# Patient Record
Sex: FEMALE | Race: BLACK OR AFRICAN AMERICAN | NOT HISPANIC OR LATINO | Employment: PART TIME | URBAN - METROPOLITAN AREA
[De-identification: names, ages, dates, MRNs, and addresses within clinical notes are randomized per-mention and may not be internally consistent; named-entity substitution may affect disease eponyms.]

---

## 2017-03-21 ENCOUNTER — HOSPITAL ENCOUNTER (OUTPATIENT)
Dept: BEHAVIORAL HEALTH | Facility: CLINIC | Age: 42
Discharge: HOME | End: 2017-03-21
Attending: SOCIAL WORKER | Admitting: SOCIAL WORKER
Payer: COMMERCIAL

## 2017-03-21 PROCEDURE — H0001 ALCOHOL AND/OR DRUG ASSESS: HCPCS

## 2017-03-21 ASSESSMENT — ANXIETY QUESTIONNAIRES
1. FEELING NERVOUS, ANXIOUS, OR ON EDGE: MORE THAN HALF THE DAYS
4. TROUBLE RELAXING: MORE THAN HALF THE DAYS
6. BECOMING EASILY ANNOYED OR IRRITABLE: SEVERAL DAYS
IF YOU CHECKED OFF ANY PROBLEMS ON THIS QUESTIONNAIRE, HOW DIFFICULT HAVE THESE PROBLEMS MADE IT FOR YOU TO DO YOUR WORK, TAKE CARE OF THINGS AT HOME, OR GET ALONG WITH OTHER PEOPLE: SOMEWHAT DIFFICULT
7. FEELING AFRAID AS IF SOMETHING AWFUL MIGHT HAPPEN: NEARLY EVERY DAY
3. WORRYING TOO MUCH ABOUT DIFFERENT THINGS: NEARLY EVERY DAY
5. BEING SO RESTLESS THAT IT IS HARD TO SIT STILL: NOT AT ALL
2. NOT BEING ABLE TO STOP OR CONTROL WORRYING: NEARLY EVERY DAY
GAD7 TOTAL SCORE: 14

## 2017-03-21 ASSESSMENT — PAIN SCALES - GENERAL: PAINLEVEL: MILD PAIN (2)

## 2017-03-21 NOTE — PROGRESS NOTES
[address] [address]               ADULT CD ASSESSMENT      Additional Clinical Questions - Outpatient    Patient Name: Acacia Poe  Cell Phone:   Home: [phone] (home) none (work)   Mobile:   Telephone Information:   Mobile [phone]       Email:  [email or web address]  Emergency Contact: Alfredo Jenkins   Tel: [phone]    ________________________________________________________________________      The patient is      With which race do you identify? / Black    Please list your family members and if they are living or , i.e. (grandparents, parents, step-parents, adoptive parents, number of siblings, half-siblings, etc.)     Mother   Living Father    No Step-mother   NA No Step-father NA   Maternal Grandmother   NA Fraternal Grandmother NA   Maternal Grandfather    NA Fraternal Grandfather NA   1 Sister(s)  No Brother(s)   NA   No Half-sister(s)   NA No Half-brother(s) NA             Who raised you? (parents, grandparents, adoptive parents, step-parents, etc.)    Adoptive parents    Have any of your family members or significant others had problems with mental illness or substance abuse?  Please explain.    Bio-sister depression, anxiety, sociopathic tendencies    Do you have any children or Stepchildren? Yes, please explain: Herberth (21), [place] (14), Karime (12)    Are you being investigated by Child Protection Services? No    Do you have a child protection worker, probation office or ? No    How would you describe your current finances?  Just making it    If you are having problems, (unpaid bills, bankruptcy, IRS problems) please explain:  Yes, please explain: unpaid bills    If working or a student are you able to function appropriately in that setting? No, please explain: not currently, hard to concentrate    Describe your preferred learning style:  by hands-on practice and by  watching someone else demonstrate    What personal strengths do you have that can help you get sober?  Drive, able to overcome adversity well.    Do you currently self-administer your medications?  Yes    Have you ever:    Had to lie to people important to you about how much you pulido?     No     Felt the need to bet more and more money?      Yes, If yes explain: no concerns.     Attempted treatment for a gambling problem?        No     Touched or fondled someone else inappropriately, or forced them to have sex with you against their will?       No     Are you or have you ever been a registered sex offender?        No     Is there any history of sexual abuse in your family?        No     [place] obsessed by your sexual behavior (having sex with many partners, masturbating often, using pornography often?        No     Received therapy or stayed in the hospital for mental health problems?        No     Hurt yourself (cutting, burning or hitting yourself)?        No     Purged, binged or restricted yourself as a way to control your weight?      No       Are you on a special diet?       No       Do you have any concerns regarding your nutritional status?        Yes, If yes explain: weight gain       Have you had any appetite changes in the last 3 months?        No       Have you had any weight loss or weight gain in the last 3 months?  If yes, how much gain or loss:     If weight patient gains more than 10 lbs or loses more than 10 lbs, refer to program RN /  Attending Physician for assessment.    Yes, If yes explain: gained a significant amount of weight over past year        Was the patient informed of BMI?         No     Do you have any dental problems?        Yes, If yes explain: need to complete a root canal     Lived through any trauma or stressful events?        Yes, If yes explain: adoption and everything that came with my adoption.     In the past month, have you had any of the following symptoms related to the  trauma listed above? (Dreams, intense memories, flashbacks, physical reactions, etc.)         No     Believed that people are spying on you, or that someone was plotting against you or trying to hurt you?       No     Believed that someone was reading your mind or could hear your thoughts or that you could actually read someone's mind, hear what another person was thinking?       No     Believed that someone or some force outside of yourself put thoughts in your mind that were not your own, or made you act in a way that was not your usual self?  Or have you ever thought you were possessed?         No     Believed that you were being sent special messages through the TV, radio or newspaper?         No     [place] things other people couldn't hear, such as voices?         No     Had visions when you were awake?  Or have you ever seen things other people couldn't see?       No         Suicide Screening Questions:    1. Are you feeling hopeless about the present/future?   No   2. Have you ever had thoughts about taking your life?   No   3. When did you have these thoughts? NA   4. Do you have any current intent or active desire to take your life?   No   5. Do you have a plan to take your life?    No   6. Have you ever made a suicide attempt?   No   7. Do you have access to pills, guns or other methods to kill yourself?   No       Risk Status - Use as Guide/Example    Ideation - Active  Thoughts of suicide Intent to follow  Through on suicide Plan for completing  suicide    Yes No Yes No Yes No   Emergent X  X  X    Urgent / Non-Emergent X  X   X   Non-Urgent X   X  X   No Current / Active Risk (Past 6 Months)  X  X  X   Acacia Poe No No No       Additional Risk Factors: Significant history of trauma and/or abuse issues  Alcohol abuse   Protective Factors:  Having people in his/her life that would prevent the patient from considering committing suicide (i.e. young children, spouse, parents, etc.)  Having easy  "access to supportive family members     Risk Status:    Emergent? No  Urgent / Non-Emergent?  No  Present / Non- Urgent? No      No Current Risk? Yes, Evaluation Counselors - Document in Epic / SBAR to counselor \"No identified risk\" and Treatment Counselors - Assess weekly in progress notes under [facility] and summarize in Discharge / Treatment summary under [facility].    Additional information to support suicide risk rating: See Above    Mental Status Assessment    Physical Appearance/Attire:  Appears stated age and Attire appropriate to age/situation  Hygiene:  well groomed  Eye Contact:  at examiner  Speech:  regular  Speech Volume:  regular  Speech Quality: fluid  Cognitive/Perceptual:  reality based  Cognition:  memory intact   Judgment:  intact  Insight:  intact  Orientation:  time, place, person and situation  Thought:  logical   Hallucinations:  none  General Behavioral Tone:  cooperative  Psychomotor Activity:  no problem noted  Gait:  no problem  Mood:  appropriate  Affect:  congruence/appropriate    Counselor Notes: NA    Criteria for Diagnosis  DSM-5 Criteria for Substance Abuse    303.90 (F10.20) Alcohol Use Disorder Severe  305.10 (Z72.0)  Tobacco Use Disorder Mild    LEVEL OF CARE    Intoxication and Withdrawal: 0  Biomedical:  0  Emotional and Behavioral:  2  Readiness to Change:  0  Relapse Potential: 3  Recovery Environmental:  2    Initial problem list:    The patient has poor coping skills  The patient lacks a sober peer support network    Patient/Client is willing to follow treatment recommendations.  Yes    Yary Michelle [facility]       Vulnerable Adult Checklist for OUTPATIENTS     1.  Do you have a physical, emotional or mental infirmity or dysfunction?       No    2.  Does this issue impair your ability to provide for your own care without help, including providing yourself with food, shelter, clothing, healthcare or supervision?       No    3.  Because of this issue, I need assistance " to protect myself from maltreatment by others.      No    Based on the above information:    This person is not a functional Vulnerable Adult according to [place] Statute 626.5572 subdivision 21.

## 2017-03-21 NOTE — PROGRESS NOTES
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 Day outpatient treatment    Do you have a preference for a particular treatment program? [facility]    Criteria for Diagnosis     Criteria for Diagnosis  DSM-5 Criteria for Substance Use Disorder  Instructions: Determine whether the client currently meets the criteria for Substance Use Disorder using the diagnostic criteria in the DSM-V pp.481-589. Current means during the most recent 12 months outside a facility that controls access to substances    Category of Substance Severity (ICD-10 Code / DSM 5 Code)     Alcohol Use Disorder Severe  (10.20) (303.90)   Cannabis Use Disorder NA   Hallucinogen Use Disorder NA   Inhalant Use Disorder NA   Opioid Use Disorder NA   Sedative, Hypnotic, or Anxiolytic Use Disorder NA   Stimulant Related Disorder NA   Tobacco Use Disorder Mild    (Z72.0) (305.1)   Other (or unknown) Substance Use Disorder NA       Collateral Contact Summary   Number of contacts made: 1    Contact with referring person:  Yes.    If court related records were reviewed, summarize here: NA    Information from collateral contacts supported/largely agreed with information from the client and associated risk ratings.      Rule 25 Assessment Summary and Plan   's  Recommendation    Client is recommended to attend the day co-occurring outpatient treatment program at [facility].      Collateral Contacts     Name:    Jose Colliernemikki   Relationship:    Therapist  [facility]   Phone Number:    [phone] Releases:    Yes     Faxed TAI 3/21/17.  She says she plans to go to recovery or AA meetings daily.  Weekly individual therapy with me or therapist there, will continue with me following her participation in that program.      Collateral Contacts     Name:    n/a   Relationship:       Phone Number:       Releases:             ollateral Contacts      A problematic pattern of alcohol/drug use leading to clinically significant impairment or distress, as manifested by at least two of the following, occurring within a 12-month period:    Alcohol/drug is often taken in larger amounts or over a longer period than was intended.  A great deal of time is spent in activities necessary to obtain alcohol, use alcohol, or recover from its effects.  Craving, or a strong desire or urge to use alcohol/drug  Continued alcohol use despite having persistent or recurrent social or interpersonal problems caused or exacerbated by the effects of alcohol/drug.  Alcohol/drug use is continued despite knowledge of having a persistent or recurrent physical or psychological problem that is likely to have been caused or exacerbated by alcohol.  Tolerance, as defined by either of the following: A need for markedly increased amounts of alcohol/drug to achieve intoxication or desired effect. and A markedly diminished effect with continued use of the same amount of alcohol/drug.  Withdrawal, as manifested by either of the following: The characteristic withdrawal syndrome for alcohol/drug (refer to Criteria A and B of the criteria set for alcohol/drug withdrawal). and Alcohol/drug (or a closely related substance, such as a benzodiazepine) is taken to relieve or avoid withdrawal symptoms.      Specify if:  In early remission:  After full criteria for alcohol/drug use disorder were previously met, none of the criteria for alcohol/drug use disorder have been met for at least 3 months but for less than 12 months (with the exception that Criterion A4,  Craving or a strong desire or urge to use alcohol/drug  may be met).     In sustained remission:   After full criteria for alcohol use disorder were previously met, non of the criteria for alcohol/drug use disorder have been met at any time during a period of 12 months or longer (with the exception that Criterion A4,  Craving or strong desire or urge to use alcohol/drug  may be met).   Specify if:   This additional specifier is used if the individual is in an environment where access to alcohol is restricted.    Mild: Presence of 2-3 symptoms    Moderate: Presence of 4-5 symptoms    Severe: Presence of 6 or more symptoms

## 2017-03-22 ENCOUNTER — BEH TREATMENT PLAN (OUTPATIENT)
Dept: BEHAVIORAL HEALTH | Facility: CLINIC | Age: 42
End: 2017-03-22
Attending: FAMILY MEDICINE

## 2017-03-22 ENCOUNTER — HOSPITAL ENCOUNTER (OUTPATIENT)
Dept: BEHAVIORAL HEALTH | Facility: CLINIC | Age: 42
End: 2017-03-22
Attending: SOCIAL WORKER
Payer: COMMERCIAL

## 2017-03-22 PROBLEM — F19.20 CHEMICAL DEPENDENCY (H): Status: ACTIVE | Noted: 2017-03-22

## 2017-03-22 PROCEDURE — H2035 A/D TX PROGRAM, PER HOUR: HCPCS | Mod: HQ

## 2017-03-22 ASSESSMENT — PATIENT HEALTH QUESTIONNAIRE - PHQ9: SUM OF ALL RESPONSES TO PHQ QUESTIONS 1-9: 12

## 2017-03-22 ASSESSMENT — ANXIETY QUESTIONNAIRES: GAD7 TOTAL SCORE: 14

## 2017-03-22 NOTE — PROGRESS NOTES
D- Client attended orientation this date. Client was given an orientation packet which was reviewed with them in its entirety. The following was specifically reviewed with the client: Program philosophy, treatment goals, program schedules, education components, the family program, using treatment materials, program rules, client rights/responsibilities, information on HIV, STDS, Hepatitis, TB, information on use while pregnant, opioid information and Narcan information, program abuse prevention plan/reporting protocol, client grievance procedure, risks of treatment, confidentiality, treatment agreement, facility tour/safety information and information on co-occurring disorders. Client was also given information on insurance and was given the business office contact information should they have any questions. Client participated in group process and sharing goals regarding treatment.    I- Signing of paperwork, tour of facility, provided client with counselor's name and phone number. Introduced to group processes by sharing and feedback, educated on stage of change.    A- Client appeared engaged and motivated.    P- Client will review orientation paperwork and materials.  Client will contact her primary counselor to schedule primary group date and discuss motivations and goals for treatment.
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
## 2017-03-22 NOTE — PROGRESS NOTES
Initial Services Plan        Before your first treatment group, please do the following    Immediate health & safety concerns: Go to the emergency room if you start to have withdrawal symptoms.    Suggestions for client during the time between intake & completion of treatment plan:  Review your patient or client handbook.    Client issues to be addressed in the first treatment sessions:  Identify motivations(s) for coming to treatment, i.e. legal, family, job, self      DANIELLE Mueller  3/22/2017  6:33 PM

## 2017-04-04 ENCOUNTER — HOSPITAL ENCOUNTER (OUTPATIENT)
Dept: BEHAVIORAL HEALTH | Facility: CLINIC | Age: 42
End: 2017-04-04
Attending: SOCIAL WORKER
Payer: COMMERCIAL

## 2017-04-04 PROCEDURE — H2035 A/D TX PROGRAM, PER HOUR: HCPCS | Mod: HQ

## 2017-04-04 ASSESSMENT — PATIENT HEALTH QUESTIONNAIRE - PHQ9: 5. POOR APPETITE OR OVEREATING: NEARLY EVERY DAY

## 2017-04-04 ASSESSMENT — ANXIETY QUESTIONNAIRES
7. FEELING AFRAID AS IF SOMETHING AWFUL MIGHT HAPPEN: NEARLY EVERY DAY
5. BEING SO RESTLESS THAT IT IS HARD TO SIT STILL: MORE THAN HALF THE DAYS
IF YOU CHECKED OFF ANY PROBLEMS ON THIS QUESTIONNAIRE, HOW DIFFICULT HAVE THESE PROBLEMS MADE IT FOR YOU TO DO YOUR WORK, TAKE CARE OF THINGS AT HOME, OR GET ALONG WITH OTHER PEOPLE: VERY DIFFICULT
2. NOT BEING ABLE TO STOP OR CONTROL WORRYING: NEARLY EVERY DAY
6. BECOMING EASILY ANNOYED OR IRRITABLE: SEVERAL DAYS
1. FEELING NERVOUS, ANXIOUS, OR ON EDGE: NEARLY EVERY DAY
3. WORRYING TOO MUCH ABOUT DIFFERENT THINGS: NEARLY EVERY DAY
GAD7 TOTAL SCORE: 18

## 2017-04-04 NOTE — PROGRESS NOTES
Patient Safety Plan Template    Name:   Acacia Poe YOB: 1975 Age:  41 year old MR Number:  [id number]   Step 1: Warning signs (Thoughts, images, mood, situation, behavior) that a crisis may be developin. Moods-sadness     2. Thoughts- failure     3. Behavior-antsy     Step 2: Internal coping strategies - Things I can do to take my mind off of my problems without contacting another person (relaxation technique, physical activity):     1. Meditation- VR     2. Go for a walk     3. Deep Breathing     Step 3: People and social settings that provide distraction:     1. Name: Naomi   Phone: [phone]   2. Name: Lyle   Phone: [phone]   3. Place: Library   4. Place: [facility]     The one thing that is most important to me and worth living for is: My children   Step 4: People whom I can ask for help:     1. Name: Daniele   Phone: [phone]     2. Name: Yessica   Phone: [phone]     3. Name: Eleni   Phone: [phone]     Step 5: Professionals or agencies I can contact during a crisis:     1. Clinician Name: Suman Espinoza   Phone: [phone]   Clinician Pager or Emergency Contact #: NA     2. Clinician Name: Jose Perez   Phone: [phone]     Clinician Pager or Emergency Contact #: NA     3. Local Urgent Care Services: [facility]    Urgent Care Services Address:     Urgent Care Services Phone: [phone]     4. Suicide Prevention Lifeline Phone: 3-862-020-RUUN (1960)     Step 6: Making the environment safe:     1. No alcohol     2. Do not allow use in the home.     Safety Plan Template 2008 Juani Rodriguez and Magdy Best is reprinted with the express permission of the authors.  No portion of the Safety Plan Template may be reproduced without the express, written permission.  You can contact the authors at [email or web address] or [email or web address].       Client was given a copy of this plan.  Silke Morelos, MSW, LADC, [facility]
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
## 2017-04-05 ASSESSMENT — ANXIETY QUESTIONNAIRES: GAD7 TOTAL SCORE: 18

## 2017-04-05 ASSESSMENT — PATIENT HEALTH QUESTIONNAIRE - PHQ9: SUM OF ALL RESPONSES TO PHQ QUESTIONS 1-9: 16

## 2017-04-10 ENCOUNTER — HOSPITAL ENCOUNTER (OUTPATIENT)
Dept: BEHAVIORAL HEALTH | Facility: CLINIC | Age: 42
End: 2017-04-10
Attending: SOCIAL WORKER
Payer: COMMERCIAL

## 2017-04-10 ENCOUNTER — BEH TREATMENT PLAN (OUTPATIENT)
Dept: BEHAVIORAL HEALTH | Facility: CLINIC | Age: 42
End: 2017-04-10

## 2017-04-10 PROCEDURE — H2035 A/D TX PROGRAM, PER HOUR: HCPCS | Mod: HQ

## 2017-04-10 PROCEDURE — H2035 A/D TX PROGRAM, PER HOUR: HCPCS

## 2017-04-10 ASSESSMENT — ANXIETY QUESTIONNAIRES
3. WORRYING TOO MUCH ABOUT DIFFERENT THINGS: NEARLY EVERY DAY
2. NOT BEING ABLE TO STOP OR CONTROL WORRYING: MORE THAN HALF THE DAYS
6. BECOMING EASILY ANNOYED OR IRRITABLE: MORE THAN HALF THE DAYS
GAD7 TOTAL SCORE: 16
IF YOU CHECKED OFF ANY PROBLEMS ON THIS QUESTIONNAIRE, HOW DIFFICULT HAVE THESE PROBLEMS MADE IT FOR YOU TO DO YOUR WORK, TAKE CARE OF THINGS AT HOME, OR GET ALONG WITH OTHER PEOPLE: SOMEWHAT DIFFICULT
5. BEING SO RESTLESS THAT IT IS HARD TO SIT STILL: MORE THAN HALF THE DAYS
1. FEELING NERVOUS, ANXIOUS, OR ON EDGE: MORE THAN HALF THE DAYS
7. FEELING AFRAID AS IF SOMETHING AWFUL MIGHT HAPPEN: NEARLY EVERY DAY

## 2017-04-10 ASSESSMENT — PATIENT HEALTH QUESTIONNAIRE - PHQ9: 5. POOR APPETITE OR OVEREATING: MORE THAN HALF THE DAYS

## 2017-04-10 NOTE — PROGRESS NOTES
Acknowledgement of Current Treatment Plan     [facility]:[id number]  I have reviewed my treatment plan with my therapist / counselor on 4/10/2017. I agree with the plan as it is written in the electronic health record. Last date of reported use of alcohol as: 04/02/2017    Name Signature   Acacia MARIANO Dari[place] Deepika    Name of Therapist / Counselor    Silke Morelos, MSW, LADC, [facility]       Current Outpatient Prescriptions   Medication     naltrexone (DEPADE;REVIA) 50 MG tablet     warfarin (COUMADIN) 5 MG tablet     warfarin (COUMADIN) 10 MG tablet     warfarin (COUMADIN) 2.5 MG tablet     ORDER FOR DME     Multiple Vitamins-Calcium (ONE-A-DAY WOMENS FORMULA) TABS     No current facility-administered medications for this encounter.

## 2017-04-10 NOTE — PROGRESS NOTES
[facility]  Adult Chemical Dependency Program  Treatment Plan Requirements    These services are provided by the facility for each patient/client according to the individual's treatment plan:    Individual and group counseling    Education    Transition services    Services to address any co-occurring mental illness    Service coordination    Initial Treatment Plan Goals:  1. Complete all the requirements of Program Orientation.  2. Maintain medication compliance throughout the program.  3. Complete requirements for workshop/skills groups based on identified issues on your problem list.  4. Complete the support group attendance feedback sheet weekly.  5. Gain family involvement in treatment process to address family issues from the problem list.  6. Attend and participate in all required groups per individual treatment plan.  7. Focus attention to individualized issues from the treatment plan.  8. Complete all requirements for UA's, alcohol screening tests and other testing.  9. Schedule a physical examination if recommended.    In addition to the above, complete all individual goals as specifically outlines on your treatment plan.    Criteria for discharge:  Patients/clients are discharged from the program following completion of the entire program including Phase I and II or acceptance of other post-treatment referrals such as senior care house, or aftercare at other facilities.  Patients/clients may also be discharged for inappropriate behavior or chemical use.      Favorable Discharge - Patients/clients have completed agreed upon treatment goals, understand their diagnosis and appear motivated about the follow-up care.    Guarded Discharge - Patients/clients have demonstrated some understanding of their diagnosis and recovery process, and have completed some of their treatment goals.  This prognosis also includes patients/clients who have completed some treatment goals but have not made  commitment to community support or follow through with referrals.    Unfavorable Discharge - Patients/clients have not completed agreed upon treatment goals due to their own choice, have limited understanding of their diagnosis, and have shown minimal or inconsistent behavior conducive to recovery.  Those patients/clients discharged due to behavioral problems will also be unfavorable discharges.                Adult CD Treatment Plan                                Acacia Poe   [id number]   1975 41 year old female      Acute Intoxication/Withdrawal Potential     DIMENSION 1  RISK FACTOR: 0    SUBSTANCE USE DISORDERS:    F10.20, alcohol use disorder, severe.   Z72.0, tobacco use disorder, mild.            Date Assigned Source Area of Treatment Focus / Goal / Treatment Strategies    Target  Date Initials Outcome Date Completed   4/10/2017  Self -  Current, History -  Current, Collateral -  Current and Assessment -  Current  Area of Treatment Focus:   Substance use, cravings and urges.  Last use date was reported as 04/02/2017.       Goal:   Develop effective strategies to maintain sobriety.      Treatment Strategies:   Report to counselor and group any alcohol or drug use. 08/29/17 MAS Changed, see below 5/9/2017    4/10/2017  Self -  Current, History -  Current, Collateral -  Current and Assessment -  Current  Area of Treatment Focus: Client stated she smokes 5 cigarettes per day.    Goal: To abstain from all nicotine products.    Treatment Strategies:  Client will read nicotine cessation pamphlet.   08/29/17 MAS Refused, clt does not want to quit at this time. 4/10/2017    5/9/2017  Self -  Current, History -  Current, Collateral -  Current and Assessment -  Current  Area of Treatment Focus:   Client stated she used over the weekend, one two separate occassions. Last use date was reported as 05/07/2017.       Goal:   Develop effective strategies to maintain sobriety.      Treatment Strategies:    Report to counselor and group any alcohol or drug use. 08/29/17 MAS Unknown due to absences 5/18/2017        Biomedical Conditions and Complaints     DIMENSION 2  RISK FACTOR: 1         Date Assigned Source Area of Treatment Focus / Goal / Treatment Strategies Target  Date Initials Outcome Date Completed   4/10/2017  Self -  Current, History -  Current, Collateral -  Current and Assessment -  Current  Area of Treatment Focus:  Client has a medical diagnosis of Blood clots.    Goal:   Follow recommendations of medical provider, Suman Damon.    Treatment Strategies:  1. Report change in severity of symptoms to staff.      2. Continue to take prescribed medications and follow-up with medical interventions while in program. 08/29/17 MAS [place] 5/18/2017        Emotional/Behavioral/Cognitive Conditions and Complications     DIMENSION 3  RISK FACTOR: 2          Date Assigned Source Area of Treatment Focus / Goal / Treatment Strategies Target  Date Initials Outcome Date Completed     4/10/2017  Self -  Current, History -  Current, Collateral -  Current and Assessment -  Current  Area of Treatment Focus:   History of symptoms of situational depression.       Goal:   Understand the relationship between addiction and mental health issues.    Treatment Strategies:  1. Client will attend a diagnostic assessment within the first month of treatment.    2. Identify 5 coping skills to reduce depression.  Practice techniques daily and when needed.    3. List 5 ways your addiction has impacted your mental health.      4. Come to group daily with a positive affirmation.    5. Client will attend DBT Psychoeducational lectures and practice skills taught during group outside group sessions as recommended.    6. Client will complete and present life and addiction history to the group one section at a time.     08/29/17              4/27/17      5/29/17      5/24/17    8/29/17    7/10/17      4/19/17 MAS Incomplete due to early  "discharge                                    Effective, childhood  Effective, Adolescent  Effective,  Adult 5/18/2017                                          4/2017    5/9/2017       5/15/2017    4/10/2017  Self -  Current, History -  Current, Collateral -  Current and Assessment -  Current  Area of Treatment Focus: Client stated she has low self esteem due to a sense of failure.    Goal: To increase self esteem and celebrate all accomplishments to overcome fear of failure.    Treatment Strategies:   1. Client will complete and then present assignment called, \"Coping with stress\".    2. Client will complete and then present assignment called, \"A different approach\".    3. Client will complete and then present assignment called, \"Correcting distorted thinking\".    4. Client will complete and then present assignment called, \"Getting out of myself\".   08/29/17 MAS Incomplete due to early discharge 5/18/2017         Readiness to Change     DIMENSION 4  RISK FACTOR: 0            Date Assigned Source Area of Treatment Focus / Goal / Treatment Strategies Target  Date Initials Outcome Date Completed   4/10/2017                                                Self -  Current, History -  Current, Collateral -  Current and Assessment -  Current  Area of Treatment Focus:  Client has consequences to self and others due to her alcohol use as evidenced by her sense of failure as a parent.      Goal:   Understand the impact your substance use has had on you, your family and significant relationships.    Treatment Strategies:  1.  Present using history, consequences of use and 5 values violated.    2. Invite family and concerned persons to family program.,         3. Participate in spiritual care groups.     4. Each week write down 3 reasons you want to remain sober.    5. Client will attend Phase 1 3 times per week for 2 hours each session for approximately 20-25 sessions.     6. Client will attend Phase 2 one time per week for 1 " "1/2 hours for approximately 12 weeks or until ready to transition from Phase 2.  08/29/17                  04/10/17    05/8 & 05/10/17      04/12/17    07/15/17    06/15/17      08/29/17 MAS Incomplete due to early discharge            Effective    Effective,  Just for 5/8/17    Did not attend               5/18/2017                   4/25/2017     05/10/2017        04/12/17                      Relapse/Continues Use/Continues Problem Potential     DIMENSION 5  RISK FACTOR: 3                Date Assigned Source Area of Treatment Focus / Goal / Treatment Strategies Target  Date Initials Outcome Date Completed   4/10/2017  Self -  Current, History -  Current, Collateral -  Current and Assessment -  Current  Area of Treatment Focus:   Client does not recognize relapse triggers and warning signs especially regarding sadness.       Goal:   Identify personal triggers and relapse warning signs, especially around sadness to decrease use episodes.    Treatment Strategies:  1.  Develop a relapse prevention plan including lifestyle changes, recovery activities, daily structure, self-care, support systems, spirituality, work, finances, recreation and crisis management.     2. Complete the triggers and cravings assignment and include alternative behaviors.     3. Identify and begin attending sober support groups.    4. Client will complete and then present assignment called, \"Communication Skills\". 08/29/17                  Given in  PH 2      04/17/17 04/27/17 MAS Incomplete due to early discharge                    Effective 5/18/2017 05/01/2017       Recovery Environment     DIMENSION 6  RISK FACTOR: 2     Date Assigned Source Area of Treatment Focus / Goal / Treatment Strategies Target  Date Initials Outcome Date Completed   4/10/2017  Self -  Current, History -  Current, Collateral -  Current and Assessment -  Current  Area of Treatment Focus:   Lacks a sober support network, where there is a " "lot of individuals in the client's life who create an increased amount of stress to the client.       Goal:   Develop a sober support network while setting boundaries to those who create stress for the client.    Treatment Strategies:  1. Complete the personal \"Recovery Care Assignments\"  A: Recovery Care Worksheet    B: Recovery Care Packet     2. List 10 situations, people, emotional responses that are unhealthy.  Develop a plan to avoid or manage them.      3. Client will secure a female sponsor whom has at least 2 years sobriety and have weekly contact with them.     4. Client will complete and then present assignment called, \"Forming stable relationships\".    5. Client will complete and then present assignment called, \"Creating a family ritual\". 08/29/17                  Given in Ph 2        6/10/17      7/15/17      4/26/17      05/9/17   MAS Incomplete due to early discharge 5/18/2017       All interventions that are designated as  current  will need to be completed in order to transition out of treatment with a favorable prognosis.   The treatment plan is a flexible document and a work in progress. Interventions and goals may be added at any time to customize plan to each individual s needs.   Client may work with therapist to change interventions as long as they pertain to the goals stipulated in the plan and/or are clinically driven.    Individual abuse prevention plan (required for lodging plus) : specific actions, referral:   No additional protection measures required other than the Program Abuse Prevention Plan - No   CRISTHIAN Lopez, LADC, LICSW                 "

## 2017-04-11 ASSESSMENT — PATIENT HEALTH QUESTIONNAIRE - PHQ9: SUM OF ALL RESPONSES TO PHQ QUESTIONS 1-9: 15

## 2017-04-11 ASSESSMENT — ANXIETY QUESTIONNAIRES: GAD7 TOTAL SCORE: 16

## 2017-04-24 ENCOUNTER — HOSPITAL ENCOUNTER (OUTPATIENT)
Dept: BEHAVIORAL HEALTH | Facility: CLINIC | Age: 42
End: 2017-04-24
Attending: SOCIAL WORKER
Payer: COMMERCIAL

## 2017-04-24 PROCEDURE — H2035 A/D TX PROGRAM, PER HOUR: HCPCS | Mod: HQ

## 2017-04-24 ASSESSMENT — ANXIETY QUESTIONNAIRES
2. NOT BEING ABLE TO STOP OR CONTROL WORRYING: SEVERAL DAYS
1. FEELING NERVOUS, ANXIOUS, OR ON EDGE: MORE THAN HALF THE DAYS
GAD7 TOTAL SCORE: 12
3. WORRYING TOO MUCH ABOUT DIFFERENT THINGS: MORE THAN HALF THE DAYS
6. BECOMING EASILY ANNOYED OR IRRITABLE: SEVERAL DAYS
5. BEING SO RESTLESS THAT IT IS HARD TO SIT STILL: SEVERAL DAYS
7. FEELING AFRAID AS IF SOMETHING AWFUL MIGHT HAPPEN: NEARLY EVERY DAY
IF YOU CHECKED OFF ANY PROBLEMS ON THIS QUESTIONNAIRE, HOW DIFFICULT HAVE THESE PROBLEMS MADE IT FOR YOU TO DO YOUR WORK, TAKE CARE OF THINGS AT HOME, OR GET ALONG WITH OTHER PEOPLE: SOMEWHAT DIFFICULT

## 2017-04-24 ASSESSMENT — PATIENT HEALTH QUESTIONNAIRE - PHQ9: 5. POOR APPETITE OR OVEREATING: MORE THAN HALF THE DAYS

## 2017-04-25 ENCOUNTER — HOSPITAL ENCOUNTER (OUTPATIENT)
Dept: BEHAVIORAL HEALTH | Facility: CLINIC | Age: 42
End: 2017-04-25
Attending: SOCIAL WORKER
Payer: COMMERCIAL

## 2017-04-25 PROCEDURE — H2035 A/D TX PROGRAM, PER HOUR: HCPCS | Mod: HQ

## 2017-04-25 ASSESSMENT — ANXIETY QUESTIONNAIRES: GAD7 TOTAL SCORE: 12

## 2017-04-25 ASSESSMENT — PATIENT HEALTH QUESTIONNAIRE - PHQ9: SUM OF ALL RESPONSES TO PHQ QUESTIONS 1-9: 13

## 2017-04-25 NOTE — PROGRESS NOTES
Acknowledgement of Current Treatment Plan     [facility]: [id number]  I have reviewed my treatment plan with my therapist / counselor on 4/25/2017. I agree with the plan as it is written in the electronic health record. Client reported last date of alcohol was 4/22/2017.    Name Signature   Acaciatanvi Poe    Name of Therapist / Counselor    Silke Morelos, MSW, LADC, [facility]       Current Outpatient Prescriptions   Medication     naltrexone (DEPADE;REVIA) 50 MG tablet     warfarin (COUMADIN) 5 MG tablet     warfarin (COUMADIN) 10 MG tablet     warfarin (COUMADIN) 2.5 MG tablet     ORDER FOR DME     Multiple Vitamins-Calcium (ONE-A-DAY WOMENS FORMULA) TABS     No current facility-administered medications for this encounter.

## 2017-05-01 ENCOUNTER — HOSPITAL ENCOUNTER (OUTPATIENT)
Dept: BEHAVIORAL HEALTH | Facility: CLINIC | Age: 42
End: 2017-05-01
Attending: SOCIAL WORKER
Payer: COMMERCIAL

## 2017-05-01 PROCEDURE — H2035 A/D TX PROGRAM, PER HOUR: HCPCS | Mod: HQ

## 2017-05-02 ENCOUNTER — HOSPITAL ENCOUNTER (OUTPATIENT)
Dept: BEHAVIORAL HEALTH | Facility: CLINIC | Age: 42
End: 2017-05-02
Attending: SOCIAL WORKER
Payer: COMMERCIAL

## 2017-05-02 PROCEDURE — H2035 A/D TX PROGRAM, PER HOUR: HCPCS | Mod: HQ

## 2017-05-04 NOTE — PROGRESS NOTES
CD ADULT Progress Note     Treatment Plan Review completed on:  5/3/2017     Attendance Dates: 05/01 & 05/2/2017. Client was absent on 05/03/2017 due to being in her daughters principle's office.    Total # of Group Sessions:  Phase I:  7 (including orientation)     MONDAY TUESDAY WEDNESDAY THURSDAY FRIDAY SATURDAY SUNDAY Total   Group Therapy 2 hours 2 hours 0 hours     4 hours   Specialty Groups*           1:1           Family Program           [place]             Phase II             Absent           Total 2 hours  2 hours 0 hours     4 hours     *Specialty Groups include Mental Health Care, Assertiveness and Communication, Sobriety Maintenance Skills, Spiritual Care, Stress Management, Relapse Prevention, Family Systems.                    Learning Style:  Visual  Hands on  Verbal    Staff member contributing:  Silke Morelos, CRISTHIAN, LADC, LICSW     Received supervision:  No    Client:  Contributed to goals- yes    Did Client receive a copy of treatment plan/revised plan: Yes    Changes to Treatment Plan:  No    Client agrees with plan/revised plan: Yes    Any changes in Vulnerable Adult Status:  No    Substance Use Disorders:  Alcohol Use Disorder Severe (F10.20) and Tobacco Use Disorder Mild (Z72.0)      [facility] Risk Ratings and Data       DIMENSION 1: Acute Intoxication/Withdrawal  The client's ability to cope with withdrawal symptoms and current state of intoxication       Acute Intoxication/Withdrawal - Current Risk Factor:  0    Reporting sober date of 4/2/17    Goals:  Develop effective strategies to maintain sobriety.               To abstain from all nicotine products.    Data: No signs of intoxication or withdrawal present. Client confirmed last date of use.       DIMENSION 2:  Biomedical Conditions and Complaints  The degree to which any physical disorder would interfere with treatment for substance abuse and the client's ability to tolerate any related discomfort     Biomedical Conditions and  Complaints - Current Risk Factor:  0    Goals:Follow recommendations of medical provider, Suamn Damon.    Data:  Client reported no concerns this week.      DIMENSION 3:  Emotional/Behavioral/Cognitive Conditions and Complications  The degree to which any condition or complications are likely to interfere with treatment for substance abuse or with function in significant life areas and the likelihood of risk of harm to self or others.     Emotional/Behavioral - Current Risk Factor:  2    DSM-5 Diagnoses:   Therapist will do a diagnostic interview to update information, Client reports situational depression     Suicide Assessment:  Risk Status    Ideation - Active thoughts of suicide Intent to follow through on suicide Plan for completing suicide    Yes No Yes No Yes No   Emergent         Urgent / Non-Emergent         Non- Urgent         No Current/Active Risk   x  x  x     Goals: Understand the relationship between addiction and mental health issues.             Client stated she has low self esteem due to a sense of failure.    Data:  Client denies current thoughts of self harm.  Client talked about the relief and stress of returning back to work this week and how she handled it without returning to use.       DIMENSION 4:  Readiness to Change  Consider the amount of support and encouragement necessary to keep the client involved in treatment.     Readiness to Change - Current Risk Factor:  2    Goals: Understand the impact your substance use has had on you, your family and significant relationships.    Data:  Client continues to miss at least one session per week for various reasons.        DIMENSION 5:  Relapse/Continued Use/Continued Problem Potential  Consider the degree to which the client recognizes relapse issues and has the skills to prevent relapse of either substance use or mental health problems.     Relapse/Continued Use/Continued Problem Potential - Current Risk Factor:  3    Goals:  Identify personal  triggers and relapse warning signs, especially around sadness to decrease use episodes.    Data:  Client presented her life and addiction (childhood section) this week and talked about significant events that happened to her during this period in her life.        DIMENSION 6:  Recovery Environment  Consider the degree to which key areas of the client's life are supportive of or antagonistic to treatment participation and recovery.     Recovery Environment - Current Risk Factor:  2    Support group attended this week:  Yes    Did family agree to attend family week:  No    If yes:  none schedule this week    Goals:  Develop a sober support network while setting boundaries to those who create stress for the client.    Data:  Client stated she plans to bring her significant other to family next week.          Intervention: Client completed PHQ-9 and BERNICE-7 screenings which were entered into Epic.   Client was asked additional questions pertaining to her childhood and the trauma she experienced.      Assessment:  Stages of Change Model  Contemplation     Client did a great job presenting her assignment this week and showing vulnerability. She answered questions freely and what appeared to be honest.     Plan:    Client will complete her life and addiction history- adolescent section on Tuesday..  Attend family next week with significant other  Attend treatment regularly.    Silke Morelos, MSW, LADC, LICSW

## 2017-05-08 ENCOUNTER — HOSPITAL ENCOUNTER (OUTPATIENT)
Dept: BEHAVIORAL HEALTH | Facility: CLINIC | Age: 42
End: 2017-05-08
Attending: SOCIAL WORKER
Payer: COMMERCIAL

## 2017-05-08 PROCEDURE — H2035 A/D TX PROGRAM, PER HOUR: HCPCS | Mod: HQ

## 2017-05-08 ASSESSMENT — ANXIETY QUESTIONNAIRES
2. NOT BEING ABLE TO STOP OR CONTROL WORRYING: MORE THAN HALF THE DAYS
GAD7 TOTAL SCORE: 7
3. WORRYING TOO MUCH ABOUT DIFFERENT THINGS: MORE THAN HALF THE DAYS
6. BECOMING EASILY ANNOYED OR IRRITABLE: NOT AT ALL
5. BEING SO RESTLESS THAT IT IS HARD TO SIT STILL: SEVERAL DAYS
7. FEELING AFRAID AS IF SOMETHING AWFUL MIGHT HAPPEN: NOT AT ALL
1. FEELING NERVOUS, ANXIOUS, OR ON EDGE: SEVERAL DAYS
IF YOU CHECKED OFF ANY PROBLEMS ON THIS QUESTIONNAIRE, HOW DIFFICULT HAVE THESE PROBLEMS MADE IT FOR YOU TO DO YOUR WORK, TAKE CARE OF THINGS AT HOME, OR GET ALONG WITH OTHER PEOPLE: SOMEWHAT DIFFICULT

## 2017-05-08 ASSESSMENT — PATIENT HEALTH QUESTIONNAIRE - PHQ9: 5. POOR APPETITE OR OVEREATING: SEVERAL DAYS

## 2017-05-09 ENCOUNTER — HOSPITAL ENCOUNTER (OUTPATIENT)
Dept: BEHAVIORAL HEALTH | Facility: CLINIC | Age: 42
End: 2017-05-09
Attending: SOCIAL WORKER
Payer: COMMERCIAL

## 2017-05-09 PROCEDURE — H2035 A/D TX PROGRAM, PER HOUR: HCPCS | Mod: HQ

## 2017-05-09 ASSESSMENT — PATIENT HEALTH QUESTIONNAIRE - PHQ9: SUM OF ALL RESPONSES TO PHQ QUESTIONS 1-9: 8

## 2017-05-09 ASSESSMENT — ANXIETY QUESTIONNAIRES: GAD7 TOTAL SCORE: 7

## 2017-05-09 NOTE — PROGRESS NOTES
Acknowledgement of Current Treatment Plan     [facility]: [id number]  I have reviewed my treatment plan with my therapist / counselor on 5/9/2017. I agree with the plan as it is written in the electronic health record. Last date of use of alcohol reported as: 05/06/2017 and 05/07/2017.    Name Signature   Acacia MARIANO Vazquezk Deepika    Name of Therapist / Counselor    Silke Morelos, MSW, LADC, LICSW       Current Outpatient Prescriptions   Medication     naltrexone (DEPADE;REVIA) 50 MG tablet     warfarin (COUMADIN) 5 MG tablet     warfarin (COUMADIN) 10 MG tablet     warfarin (COUMADIN) 2.5 MG tablet     ORDER FOR DME     Multiple Vitamins-Calcium (ONE-A-DAY WOMENS FORMULA) TABS     No current facility-administered medications for this encounter.

## 2017-05-15 ENCOUNTER — HOSPITAL ENCOUNTER (OUTPATIENT)
Dept: BEHAVIORAL HEALTH | Facility: CLINIC | Age: 42
End: 2017-05-15
Attending: SOCIAL WORKER
Payer: COMMERCIAL

## 2017-05-15 PROCEDURE — H2035 A/D TX PROGRAM, PER HOUR: HCPCS | Mod: HQ

## 2017-05-15 ASSESSMENT — ANXIETY QUESTIONNAIRES
1. FEELING NERVOUS, ANXIOUS, OR ON EDGE: SEVERAL DAYS
GAD7 TOTAL SCORE: 8
7. FEELING AFRAID AS IF SOMETHING AWFUL MIGHT HAPPEN: SEVERAL DAYS
6. BECOMING EASILY ANNOYED OR IRRITABLE: SEVERAL DAYS
IF YOU CHECKED OFF ANY PROBLEMS ON THIS QUESTIONNAIRE, HOW DIFFICULT HAVE THESE PROBLEMS MADE IT FOR YOU TO DO YOUR WORK, TAKE CARE OF THINGS AT HOME, OR GET ALONG WITH OTHER PEOPLE: SOMEWHAT DIFFICULT
3. WORRYING TOO MUCH ABOUT DIFFERENT THINGS: SEVERAL DAYS
2. NOT BEING ABLE TO STOP OR CONTROL WORRYING: MORE THAN HALF THE DAYS
5. BEING SO RESTLESS THAT IT IS HARD TO SIT STILL: NOT AT ALL

## 2017-05-15 ASSESSMENT — PATIENT HEALTH QUESTIONNAIRE - PHQ9: 5. POOR APPETITE OR OVEREATING: MORE THAN HALF THE DAYS

## 2017-05-16 ASSESSMENT — ANXIETY QUESTIONNAIRES: GAD7 TOTAL SCORE: 8

## 2017-05-16 ASSESSMENT — PATIENT HEALTH QUESTIONNAIRE - PHQ9: SUM OF ALL RESPONSES TO PHQ QUESTIONS 1-9: 8
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
## 2019-09-29 ENCOUNTER — HEALTH MAINTENANCE LETTER (OUTPATIENT)
Age: 44
End: 2019-09-29

## 2021-01-14 ENCOUNTER — HEALTH MAINTENANCE LETTER (OUTPATIENT)
Age: 46
End: 2021-01-14

## 2021-03-14 ENCOUNTER — HEALTH MAINTENANCE LETTER (OUTPATIENT)
Age: 46
End: 2021-03-14

## 2021-10-24 ENCOUNTER — HEALTH MAINTENANCE LETTER (OUTPATIENT)
Age: 46
End: 2021-10-24

## 2022-02-13 ENCOUNTER — HEALTH MAINTENANCE LETTER (OUTPATIENT)
Age: 47
End: 2022-02-13

## 2022-04-10 ENCOUNTER — HEALTH MAINTENANCE LETTER (OUTPATIENT)
Age: 47
End: 2022-04-10

## 2022-10-15 ENCOUNTER — HEALTH MAINTENANCE LETTER (OUTPATIENT)
Age: 47
End: 2022-10-15

## 2023-03-26 ENCOUNTER — HEALTH MAINTENANCE LETTER (OUTPATIENT)
Age: 48
End: 2023-03-26

## 2023-06-01 ENCOUNTER — HEALTH MAINTENANCE LETTER (OUTPATIENT)
Age: 48
End: 2023-06-01

## 2023-09-08 ENCOUNTER — HOSPITAL ENCOUNTER (EMERGENCY)
Facility: CLINIC | Age: 48
Discharge: HOME | End: 2023-09-08
Attending: EMERGENCY MEDICINE | Admitting: EMERGENCY MEDICINE
Payer: COMMERCIAL

## 2023-09-08 ENCOUNTER — APPOINTMENT (OUTPATIENT)
Dept: GENERAL RADIOLOGY | Facility: CLINIC | Age: 48
End: 2023-09-08
Attending: EMERGENCY MEDICINE
Payer: COMMERCIAL

## 2023-09-08 VITALS
BODY MASS INDEX: 48.82 KG/M2 | WEIGHT: 293 LBS | HEART RATE: 82 BPM | HEIGHT: 65 IN | RESPIRATION RATE: 10 BRPM | DIASTOLIC BLOOD PRESSURE: 72 MMHG | OXYGEN SATURATION: 96 % | SYSTOLIC BLOOD PRESSURE: 114 MMHG | TEMPERATURE: 98.6 F

## 2023-09-08 DIAGNOSIS — Z87.09 HISTORY OF ASTHMA: ICD-10-CM

## 2023-09-08 DIAGNOSIS — Z79.01 CHRONIC ANTICOAGULATION: ICD-10-CM

## 2023-09-08 DIAGNOSIS — R06.02 SHORTNESS OF BREATH: ICD-10-CM

## 2023-09-08 LAB
ANION GAP SERPL CALCULATED.3IONS-SCNC: 12 MMOL/L (ref 7–15)
BASE EXCESS BLDV CALC-SCNC: 4 MMOL/L (ref -7.7–1.9)
BUN SERPL-MCNC: 14.3 MG/DL (ref 6–20)
CALCIUM SERPL-MCNC: 10.1 MG/DL (ref 8.6–10)
CHLORIDE SERPL-SCNC: 101 MMOL/L (ref 98–107)
CREAT SERPL-MCNC: 0.65 MG/DL (ref 0.51–0.95)
D DIMER PPP FEU-MCNC: 0.3 UG/ML FEU (ref 0–0.5)
DEPRECATED HCO3 PLAS-SCNC: 25 MMOL/L (ref 22–29)
EGFRCR SERPLBLD CKD-EPI 2021: >90 ML/MIN/1.73M2
GLUCOSE SERPL-MCNC: 106 MG/DL (ref 70–99)
HCO3 BLDV-SCNC: 30 MMOL/L (ref 21–28)
HOLD SPECIMEN: NORMAL
O2/TOTAL GAS SETTING VFR VENT: 0 %
PCO2 BLDV: 49 MM HG (ref 40–50)
PH BLDV: 7.39 [PH] (ref 7.32–7.43)
PO2 BLDV: 32 MM HG (ref 25–47)
POTASSIUM SERPL-SCNC: 3.9 MMOL/L (ref 3.4–5.3)
SODIUM SERPL-SCNC: 138 MMOL/L (ref 136–145)
TROPONIN T SERPL HS-MCNC: <6 NG/L

## 2023-09-08 PROCEDURE — 99284 EMERGENCY DEPT VISIT MOD MDM: CPT | Mod: 25 | Performed by: EMERGENCY MEDICINE

## 2023-09-08 PROCEDURE — 82803 BLOOD GASES ANY COMBINATION: CPT | Performed by: EMERGENCY MEDICINE

## 2023-09-08 PROCEDURE — 93005 ELECTROCARDIOGRAM TRACING: CPT | Performed by: EMERGENCY MEDICINE

## 2023-09-08 PROCEDURE — 99285 EMERGENCY DEPT VISIT HI MDM: CPT | Mod: 25 | Performed by: EMERGENCY MEDICINE

## 2023-09-08 PROCEDURE — 93010 ELECTROCARDIOGRAM REPORT: CPT | Performed by: EMERGENCY MEDICINE

## 2023-09-08 PROCEDURE — 250N000013 HC RX MED GY IP 250 OP 250 PS 637: Performed by: EMERGENCY MEDICINE

## 2023-09-08 PROCEDURE — 36415 COLL VENOUS BLD VENIPUNCTURE: CPT | Performed by: EMERGENCY MEDICINE

## 2023-09-08 PROCEDURE — 80048 BASIC METABOLIC PNL TOTAL CA: CPT | Performed by: EMERGENCY MEDICINE

## 2023-09-08 PROCEDURE — 71046 X-RAY EXAM CHEST 2 VIEWS: CPT

## 2023-09-08 PROCEDURE — 84484 ASSAY OF TROPONIN QUANT: CPT | Performed by: EMERGENCY MEDICINE

## 2023-09-08 PROCEDURE — 85379 FIBRIN DEGRADATION QUANT: CPT | Performed by: EMERGENCY MEDICINE

## 2023-09-08 RX ORDER — ACETAMINOPHEN 325 MG/1
650 TABLET ORAL ONCE
Status: COMPLETED | OUTPATIENT
Start: 2023-09-08 | End: 2023-09-08

## 2023-09-08 RX ORDER — PREDNISONE 20 MG/1
20 TABLET ORAL DAILY
Qty: 3 TABLET | Refills: 0 | Status: SHIPPED | OUTPATIENT
Start: 2023-09-08

## 2023-09-08 RX ADMIN — ACETAMINOPHEN 650 MG: 325 TABLET, FILM COATED ORAL at 10:21

## 2023-09-08 ASSESSMENT — ENCOUNTER SYMPTOMS
SHORTNESS OF BREATH: 1
ENDOCRINE NEGATIVE: 1
MUSCULOSKELETAL NEGATIVE: 1
EYES NEGATIVE: 1
NEUROLOGICAL NEGATIVE: 1
CONSTITUTIONAL NEGATIVE: 1
CARDIOVASCULAR NEGATIVE: 1
HEMATOLOGIC/LYMPHATIC NEGATIVE: 1
ALLERGIC/IMMUNOLOGIC NEGATIVE: 1
PSYCHIATRIC NEGATIVE: 1
GASTROINTESTINAL NEGATIVE: 1

## 2023-09-08 ASSESSMENT — ACTIVITIES OF DAILY LIVING (ADL)
ADLS_ACUITY_SCORE: 35
ADLS_ACUITY_SCORE: 35

## 2023-09-08 NOTE — DISCHARGE INSTRUCTIONS
1) Your evaluation did not reveal the exact cause of your shortness of breath upon awakening this morning prior to arriving by EMS.  After receiving some nebs prearrival you appear improved, after period of care you appears stable and did not require oxygen. We have did not allow you to go home to treat for suspicion that his symptoms may be due to asthma.  History of childhood asthma but no prior hospitalization for asthma.    2) For home you are placed on prednisone to take daily over the next 3 days.  It may be helpful to cut back on smoking if able.    3) Best wishes during your treatment program

## 2023-09-08 NOTE — ED TRIAGE NOTES
Pt comes from [facility], pt woke up with sob and some left sided back/flank pain. Pt given neb treatment by EMS, pt tolerated well. O2 sats 94% on room air. 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 Pt also complaining of right arm pain.      Triage Assessment       Row Name 09/08/23 1016       Respiratory WDL    Respiratory WDL X;rhythm/pattern    Rhythm/Pattern, Respiratory shortness of breath

## 2023-09-08 NOTE — ED PROVIDER NOTES
History     Chief Complaint   Patient presents with    Shortness of Breath     Pt comes from [facility], pt woke up with sob and some left sided back/flank pain. Pt given neb treatment by EMS, pt tolerated well. O2 sats 94% on room air. 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 Pt also complaining of right arm pain.     Arm Pain     HPI  Acacia Poe is a 48 year old female who presents for evaluation of back pain and shortness of breath.  EMS providers administered DuoNeb and albuterol neb prior to ED arrival.    Medical records show history of exercise-induced asthma, tobacco use disorder history of pulmonary embolism alcohol abuse and chemical dependency anemia.  Patient is anticoagulated with xarelto and on naltrexone.     On examination patient says she is originally from [facility] and works for [employer].  She is currently in treatment at [facility] for alcohol use disorder and has been in treatment last 10 days.  She smokes about 3 to 4 cigarettes/day since she has been in treatment and reports she woke up this morning feeling really short of breath.  EMS providers report she was 94% on room air and there is some wheezing on auscultation and she received neb treatments prior to ED arrival.  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  She reports she was treated for stroke in 2018.  Is no chest pain and reports no new lower extremity swelling no fever.  Patient reports she had some right arm pain with no arm weakness or numbness.    Allergies:  Allergies   Allergen Reactions    Lisinopril Angioedema       Problem List:    Patient Active Problem List    Diagnosis Date Noted    Chemical dependency (H) 03/22/2017     Priority: Medium    Alcohol abuse 04/19/2016     Priority: Medium    Pulmonary embolism (H) 08/08/2015     Priority: Medium    Cervical high risk HPV (human papillomavirus) test  positive 10/01/2014     Priority: Medium     10/2/14: NIL pap, + HPV (not 16 or 18). Plan cotest pap & HPV in 1 year. Tracking started.  16 Dx pap= NIL, Neg HPV. Co-testing in 3 yrs        YUNIOR (obstructive sleep apnea) AHI 28 CPAP 11-15 10/24/2013     Priority: Medium    Exercise-induced asthma 2013     Priority: Medium    Tobacco use 2013     Priority: Medium    History of anemia 2013     Priority: Medium     Iron supplements are hard on stomach.  Has tried slow-release and lower dosage.  Takes a one-a-day that usually gets her to 10.  Found out 20 yeara go when trying to give blood in high school. Sister has sickle cell trait but she hasn't been checked.  Other family history unknown - adopted          Past Medical History:    Past Medical History:   Diagnosis Date    Cervical high risk HPV (human papillomavirus) test positive 10/2014    PE (pulmonary embolism)        Past Surgical History:    Past Surgical History:   Procedure Laterality Date    ABDOMEN SURGERY  2013    Appendectomy    APPENDECTOMY      bilateral tubal ligation  2004     SECTION         Family History:    Family History   Problem Relation Age of Onset    Unknown/Adopted Mother     Lupus Sister     Depression Sister     Anxiety Disorder Sister     Bipolar Disorder Sister     Substance Abuse Sister     Unknown/Adopted Father     Unknown/Adopted Maternal Grandmother     Unknown/Adopted Maternal Grandfather     Unknown/Adopted Paternal Grandmother     Unknown/Adopted Paternal Grandfather     Unknown/Adopted Brother     Depression Son     Anxiety Disorder Son     Bipolar Disorder Son        Social History:  Marital Status:  Legally  [3]  Social History     Tobacco Use    Smoking status: Every Day     Packs/day: 0.25     Years: 16.00     Pack years: 4.00     Types: Cigarettes     Last attempt to quit: 2015     Years since quittin.1    Smokeless tobacco: Never    Tobacco comments:     Quit Plan  "info given 8/15/13   Substance Use Topics    Alcohol use: Yes     Alcohol/week: 3.0 standard drinks of alcohol     Types: 2 Cans of beer, 1 Shots of liquor per week     Comment: occ.    Drug use: No        Medications:    predniSONE (DELTASONE) 20 MG tablet  Multiple Vitamins-Calcium (ONE-A-DAY WOMENS FORMULA) TABS  naltrexone (DEPADE;REVIA) 50 MG tablet  ORDER FOR DME  warfarin (COUMADIN) 10 MG tablet  warfarin (COUMADIN) 2.5 MG tablet  warfarin (COUMADIN) 5 MG tablet          Review of Systems   Constitutional: Negative.    HENT: Negative.     Eyes: Negative.    Respiratory:  Positive for shortness of breath.    Cardiovascular: Negative.    Gastrointestinal: Negative.    Endocrine: Negative.    Genitourinary: Negative.    Musculoskeletal: Negative.    Skin: Negative.    Allergic/Immunologic: Negative.    Neurological: Negative.    Hematological: Negative.    Psychiatric/Behavioral: Negative.     All other systems reviewed and are negative.      Physical Exam   BP: 133/88  Pulse: 82  Temp: 98.6  F (37  C)  Resp: 20  Height: 165.1 cm (5' 5\")  Weight: (!) 167.8 kg (370 lb)  SpO2: 96 %      Physical Exam  Constitutional:       Appearance: She is well-developed. She is not ill-appearing, toxic-appearing or diaphoretic.   HENT:      Head: Normocephalic and atraumatic.      Mouth/Throat:      Mouth: Mucous membranes are moist.   Eyes:      Extraocular Movements: Extraocular movements intact.      Pupils: Pupils are equal, round, and reactive to light.   Pulmonary:      Effort: Pulmonary effort is normal.      Breath sounds: Examination of the left-lower field reveals wheezing. Wheezing present.   Chest:      Chest wall: No mass, deformity, tenderness, crepitus or edema. There is no dullness to percussion.   Musculoskeletal:      Cervical back: Normal range of motion and neck supple.      Right lower leg: No tenderness. No edema.      Left lower leg: No tenderness. No edema.   Skin:     Capillary Refill: Capillary refill " takes less than 2 seconds.      Coloration: Skin is not cyanotic or pale.      Findings: No ecchymosis, erythema or rash.      Nails: There is no clubbing.   Neurological:      General: No focal deficit present.      Mental Status: She is alert and oriented to person, place, and time.   Psychiatric:         Mood and Affect: Mood normal. Mood is not anxious.         Behavior: Behavior normal. Behavior is not agitated.         ED Course               EKG Interpretation:      Interpreted by Juan Leary MD  Time reviewed:1040   Symptoms at time of EKG: Right arm pain   Rhythm: Normal sinus   Rate: Normal  Axis: Normal  Ectopy: None  Conduction: Normal  ST Segments/ T Waves: T wave flattening with subtle depression in III and avF  Q Waves: Nonspecific  Comparison to prior: Compared with EKG dated 8/7/2015 no acute changes appreciated    Clinical Impression: no acute changes        Procedures            Critical Care time:  none             ED medications:  Medications   acetaminophen (TYLENOL) tablet 650 mg (650 mg Oral $Given 9/8/23 1021)       ED Vitals:  Vitals:    09/08/23 1030 09/08/23 1105 09/08/23 1120 09/08/23 1135   BP: 122/73 112/86  114/72   Pulse: 82 83 82 82   Resp: 18  22 10   Temp:       TempSrc:       SpO2: 97%  97% 96%   Weight:       Height:            ED labs and imaging:  Results for orders placed or performed during the hospital encounter of 09/08/23   Chest XR,  PA & LAT     Status: None    Narrative    XR CHEST 2 VIEWS   9/8/2023 1:04 PM     HISTORY: Acute dyspnea. Hx of asthma, Large BMI, anticogulated.  Evaluate for acute cardiopulmonary process.    COMPARISON: Chest CT 8/7/2015.      Impression    IMPRESSION: No acute cardiopulmonary disease.    AGATHA BAUER MD         SYSTEM ID:  [id number]   [place] Draw     Status: None    Narrative    The following orders were created for panel order [place] Draw.  Procedure                               Abnormality         Status                      ---------                               -----------         ------                     Extra Blue Top Tube[[id number]]                              Final result               Extra Red Top Tube[[id number]]                               Final result               Extra Green Top (Lithium...[[id number]]                      Final result               Extra Purple Top Tube[[id number]]                            Final result                 Please view results for these tests on the individual orders.   Extra Blue Top Tube     Status: None   Result Value Ref Range    Hold Specimen JIC    Extra Red Top Tube     Status: None   Result Value Ref Range    Hold Specimen JIC    Extra Green Top (Lithium Heparin) Tube     Status: None   Result Value Ref Range    Hold Specimen JIC    Extra Purple Top Tube     Status: None   Result Value Ref Range    Hold Specimen JIC    Basic metabolic panel     Status: Abnormal   Result Value Ref Range    Sodium 138 136 - 145 mmol/L    Potassium 3.9 3.4 - 5.3 mmol/L    Chloride 101 98 - 107 mmol/L    Carbon Dioxide (CO2) 25 22 - 29 mmol/L    Anion Gap 12 7 - 15 mmol/L    Urea Nitrogen 14.3 6.0 - 20.0 mg/dL    Creatinine 0.65 0.51 - 0.95 mg/dL    Calcium 10.1 (H) 8.6 - 10.0 mg/dL    Glucose 106 (H) 70 - 99 mg/dL    GFR Estimate >90 >60 mL/min/1.73m2   Blood gas venous     Status: Abnormal   Result Value Ref Range    pH Venous 7.39 7.32 - 7.43    pCO2 Venous 49 40 - 50 mm Hg    pO2 Venous 32 25 - 47 mm Hg    Bicarbonate Venous 30 (H) 21 - 28 mmol/L    Base Excess/Deficit 4.0 (H) -7.7 - 1.9 mmol/L    FIO2 0    D dimer quantitative     Status: Normal   Result Value Ref Range    D-Dimer Quantitative 0.30 0.00 - 0.50 ug/mL FEU    Narrative    This D-dimer assay is intended for use in conjunction with a clinical pretest probability assessment model to exclude pulmonary embolism (PE) and deep venous thrombosis (DVT) in outpatients suspected of PE or DVT. The cut-off value is 0.50 ug/mL FEU.   Troponin  T, High Sensitivity     Status: Normal   Result Value Ref Range    Troponin T, High Sensitivity <6 <=14 ng/L          Assessments & Plan (with Medical Decision Making)   Assessment Summary and Clinical Impression: 48 year old female who presented by EMS with shortness of breath. 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  She noted that she had been compliant with her anticoagulation.  No fever or chills.  Child with a history of asthma but no prior hospitalization for asthma exacerbation.  She received neb treatments prior to ED arrival by EMS providers noted some wheezing on their initial assessment.  She reported no active chest pain but noted right upper arm pain without weakness or numbness.  Given her age and comorbidities she was monitored frequent vital signs and work-up was initiated which did not reveal any acute findings.  Patient has history of suspicion that shortness of breath could potentially be related to asthma exacerbation with wheezing improved with bronchodilator therapy pre-arrival Discharged on prednisone with watchful waiting and low threshold to return for evaluation.    ED course and plan:  Reviewed the medical record.  Last chest imaging 11/4/2022.  With improvement in symptoms on arrival work-up was undertaken to ensure acute dyspnea was not due to other causes.  EKG on arrival did not show any acute ischemia.  Subtle T wave changes in inferior leads when compared with EKG from 8/7/2015 revealed no acute changes. Her work-up revealed normal troponin and normal D-dimer.  No true acidosis.  XR chest revealed no acute cardiopulmonary process that explain her sudden dyspnea as reported. Given patient reports she been compliant with her anticoagulation with a DOAC normal D-dimer and oxygen  requirement during her visit she is discharged with prednisone to take daily for 3 days after reported some improvement in treatment provided by EMS providers with suspicion that she could have potential of an asthma exacerbation although the exact trigger is not clear.      Disclaimer: This note consists of symbols derived from keyboarding, dictation and/or voice recognition software. As a result, there may be errors in the script that have gone undetected. Please consider this when interpreting information found in this chart.   I have reviewed the nursing notes.    I have reviewed the findings, diagnosis, plan and need for follow up with the patient.           Medical Decision Making  The patient's presentation was of moderate complexity (an acute illness with systemic symptoms).    The patient's evaluation involved:  review of 2 test result(s) ordered prior to this encounter (diagnostic imaging and labs)    The patient's management necessitated moderate risk (prescription drug management including medications given in the ED).        New Prescriptions    PREDNISONE (DELTASONE) 20 MG TABLET    Take 1 tablet (20 mg) by mouth daily       Final diagnoses:   Shortness of breath - Query asthma exacerbation   Chronic anticoagulation - on xarelto for history of acquired DVT and PE   History of asthma - Childhood.  No prior hospitalization       9/8/2023   [facility] EMERGENCY DEPT       Juan Leary MD  09/08/23 9706

## 2024-05-26 ENCOUNTER — HEALTH MAINTENANCE LETTER (OUTPATIENT)
Age: 49
End: 2024-05-26

## 2024-06-09 ENCOUNTER — APPOINTMENT (OUTPATIENT)
Dept: GENERAL RADIOLOGY | Facility: CLINIC | Age: 49
End: 2024-06-09
Attending: EMERGENCY MEDICINE
Payer: COMMERCIAL

## 2024-06-09 ENCOUNTER — HOSPITAL ENCOUNTER (EMERGENCY)
Facility: CLINIC | Age: 49
Discharge: HOME | End: 2024-06-09
Attending: EMERGENCY MEDICINE | Admitting: EMERGENCY MEDICINE
Payer: COMMERCIAL

## 2024-06-09 VITALS
DIASTOLIC BLOOD PRESSURE: 67 MMHG | RESPIRATION RATE: 18 BRPM | OXYGEN SATURATION: 98 % | HEART RATE: 91 BPM | SYSTOLIC BLOOD PRESSURE: 125 MMHG | TEMPERATURE: 98.2 F

## 2024-06-09 DIAGNOSIS — R45.1 AGITATION: ICD-10-CM

## 2024-06-09 DIAGNOSIS — F10.929 ALCOHOLIC INTOXICATION WITH COMPLICATION (H): ICD-10-CM

## 2024-06-09 PROBLEM — F33.2 SEVERE EPISODE OF RECURRENT MAJOR DEPRESSIVE DISORDER, WITHOUT PSYCHOTIC FEATURES (H): Status: ACTIVE | Noted: 2024-06-09

## 2024-06-09 PROBLEM — F41.1 GAD (GENERALIZED ANXIETY DISORDER): Status: ACTIVE | Noted: 2024-06-09

## 2024-06-09 PROBLEM — F43.10 PTSD (POST-TRAUMATIC STRESS DISORDER): Status: ACTIVE | Noted: 2024-06-09

## 2024-06-09 PROCEDURE — 99283 EMERGENCY DEPT VISIT LOW MDM: CPT

## 2024-06-09 PROCEDURE — 73110 X-RAY EXAM OF WRIST: CPT | Mod: LT

## 2024-06-09 ASSESSMENT — ACTIVITIES OF DAILY LIVING (ADL)
ADLS_ACUITY_SCORE: 35

## 2024-06-09 NOTE — DISCHARGE INSTRUCTIONS
Mental health recommendations    Please follow-up with your outpatient team about your visit today.    2.  One of our care coordinators will reach out to you after your discharge.  If you have any questions about additional resources please call our coordinators at # [phone].     3.  Please use aftercare plan and already established coping skills and safety planning as needed.     4.  Please call 911 and/or return to the emergency department if her symptoms worsen or your safety becomes compromised. Your [facility] has a mental health crisis team you can call 24/7: [facility] [place], [phone]         Walk in psychiatry services     [employer] [facility] services   [phone]  Same-Day Care Option  At our [facility], [facility], and [facility], we now provide same-day therapy/counseling support (for new and returning clients) and/or prescriber/medication services (for returning clients only) without an appointment. Same-day services vary per location. For clinic hours and services per location, visit each clinic's webpage.     To participate, simply walk in during open clinic hours and wait for an opening. These services are available based on client cancellations and no-shows. You can also call Central Access at [phone] to be placed on a waitlist. Based upon the number of cancellations or no-shows for a particular day and the number of clients on the waitlist, we cannot, unfortunately, guarantee that you will be seen through this same-day care option.  We will, however, communicate any clinic scheduling changes as they occur and do our best to accommodate your needs.     [facility]   [phone]  Acute Psychiatry Services (APS)  Acute Psychiatric Services (APS) provides emergency services, 24 hours a day 7 days a week, to persons experiencing mental health crises including psychosis, depression, violence or suicide, and other crisis  situations Clients may present on a walk-in basis or are referred from outside agencies or individuals.     Aftercare Plan    If I am feeling unsafe or I am in a crisis, I will:   Contact my established care providers   Call the National Suicide Prevention Lifeline: 474.761.6359   Go to the nearest emergency room   Call 911   Your [facility] has a mental health crisis team you can call 24/7: [facility], [phone]    Warning signs that I or other people might notice when a crisis is developing for me: increased alcohol use, low mood, increases stressors     Things I am able to do on my own to cope or help me feel better:   -Practice square breathing when I begin to feel anxious - in breath through the nose for the count   of 4 and the first line on the square. Out breath through the mouth for the count of 4 for the second line   of the square. Repeat to complete the square. Repeat the square as many times as needed.    Things that I am able to do with others to cope or help me feel better: -Use community resources, including hotline numbers, [facility] crisis and support meetings    Things I can use or do for distraction: -Distraction skills of: going for walks, watching TV, spending time outside, calling a friend or family member  -Download a meditation nikole and spend 15-20 minutes per day mediating/relaxing. Some apps to   download include: Calm, Headspace and Insight Timer. All 3 of these apps have free version    Changes I can make to support my mental health and wellness:   -Attend scheduled mental health therapy and psychiatric appointments and follow all recommendations  -Maintain a daily schedule/routine  -Practice deep breathing skills  -Abstain from all mood altering chemicals not currently prescribed to me     People in my life that I can ask for help: National [place] [employer] Mental Illness (ELIZABETH)  069.117.8961 or 1.888.ELIZABETH.HELPS    Other things that are important when I m in crisis: -Commit to 30  "minutes of self care daily - this can be as simple as taking a shower, going for a walk, cooking a meal, read, writing, etc        Crisis Lines  Crisis Text Line  Text 507687  You will be connected with a trained live crisis counselor to provide support.    Por damion, texto  YARIEL a 890192 o texto a 442-AYUDAME en WhatsApp    National Hope Line  1.800.SUICIDE [6109205]      Community Resources  Fast Tracker  Linking people to mental health and substance use disorder resources  [employer]     [place] Mental Health Warm Line  Peer to peer support  Monday thru Saturday, 12 pm to 10 pm  452.585.4851 or 7.620.946.0140  Text \"Support\" to 69034    National [place] on Mental Illness (ELIZABETH)  400.040.9126 or 1.888.ELIZABETH.HELPS        Mental Health Apps  My3  https://Mind [employer]ette.org/    VirtualHopeBox  https://Mind [employer]ette/apps/virtual-hope-box/      Additional Information  Today you were seen by a licensed mental health professional through Triage and Transition services, Behavioral Healthcare Providers ([facility])  for a crisis assessment in the Emergency Department at [facility].  It is recommended that you follow up with your established providers (psychiatrist, mental health therapist, and/or primary care doctor - as relevant) as soon as possible. Coordinators from [facility] will be calling you in the next 24-48 hours to ensure that you have the resources you need.  You can also contact [facility] coordinators directly at [phone]. You may have been scheduled for or offered an appointment with a mental health provider. [facility] maintains an extensive network of licensed behavioral health providers to connect patients with the services they need.  We do not charge providers a fee to participate in our referral network.  We match patients with providers based on a patient's specific needs, insurance coverage, and location.  Our first effort will be to refer you to a provider within your care system, and will utilize " providers outside your care system as needed.                 Discharge Instructions  Alcohol Intoxication    You have been seen today with alcohol intoxication. This means that you have enough alcohol in your system to impair your ability to mentally and physically function, perhaps to the extent that you were unable to care for yourself.    Generally, every Emergency Department visit should have a follow-up clinic visit with either a primary or a specialty clinic/provider. Please follow-up as instructed by your emergency provider today.    You may have come to the Emergency Department because of your intoxication, or for another reason, such as because of an injury. No matter what the case is, this visit is a  red flag  regarding alcohol use, and you should consider whether your drinking pattern is a problem for you.     You may be at risk for alcohol-related problems if:    Men: you drink more than 14 drinks per week, or more than 4 drinks per occasion.    Women: you drink more than 7 drinks per week or more than 3 drinks per occasion.    You have black-outs.  You do things you regret while drinking.  You have legal problems because of drinking.  You have job problems because of drinking (you call in sick to work because of drinking).    CAGE Questions  Have you ever felt you should cut down on your drinking?  Have people annoyed you by criticizing your drinking?  Have you ever felt bad or guilty about your drinking?  Have you ever had a drink first thing in the morning to steady your nerves or get rid of a hangover (eye opener)?    If you answer yes to any of the CAGE questions, you may have a problem with alcohol.      Return to the Emergency Department if:  You become shaky or tremble when you try to stop drinking.   You have severe abdominal pain (belly pain).   You have a seizure or pass out.    You vomit (throw up) blood or have blood in your stool. This may be bright red or it may look like black coffee  grounds.  You become lightheaded or faint.      For further help, contact:   Your caregiver.    Alcoholics Anonymous (AA).    [facility] Intergroup: [phone]  [facility] Central Office: [phone]   A drug or alcohol rehabilitation program.    You can get information on alcohol resources and groups by calling the number 211 or 1-658.676.2413 on any phone.     Seek medical care if:  You have persistent vomiting.   You have persistent pain in any part of your body.    You do not feel better after a few days.    If you were given a prescription for medicine here today, be sure to read all of the information (including the package insert) that comes with your prescription.  This will include important information about the medicine, its side effects, and any warnings that you need to know about.  The pharmacist who fills the prescription can provide more information and answer questions you may have about the medicine.  If you have questions or concerns that the pharmacist cannot address, please call or return to the Emergency Department.   Remember that you can always come back to the Emergency Department if you are not able to see your regular doctor in the amount of time listed above, if you get any new symptoms, or if there is anything that worries you.    Discharge Instructions  Mental Health Concerns    You were seen today for mental health concerns, such as depression, anxiety, or suicidal thinking. Your provider feels that you do not require hospitalization at this time. However, your symptoms may become worse, and you may need to return to the Emergency Department. Most treatments of depression and suicidal thoughts are a process rather than a single intervention.  Medications and counseling can take several weeks or more to help.    Generally, every Emergency Department visit should have a follow-up clinic visit with either a primary or a specialty clinic/provider. Please  follow-up as instructed by your emergency provider today.    By accepting these discharge instructions:  You promise to not harm yourself or others.  You agree that if you feel you are becoming unable to keep that promise, you will do something to help yourself before you do anything to harm yourself or others.   You agree to keep any safety plan arranged on your visit here today.  You agree to take any medication prescribed or recommended by your provider.  If you are getting worse, you can contact a friend or a family member, contact your counselor or family provider, contact a crisis line, or other options discussed with the provider or therapist today.  At any time, you can call 911 and return to the Emergency Department for more help.  You understand that follow-up is essential to your treatment, and you will make and keep appointments recommended on your visit today.    How to improve your mental health and prevent suicide:  Involve others by letting family, friends, counselors know.  Do not isolate yourself.  Avoid alcohol or drugs. Remove weapons, poisons from your home.  Try to stick to routines for eating, sleeping and getting regular exercise.    Try to get into sunlight. Bright natural light not only treats seasonal affective disorder but also depression.  Increase safe activities that you enjoy.    If you feel worse, contact 2-603-OBBQLYW ([phone]), or call 911, or your primary provider/counselor for additional assistance.    If you were given a prescription for medicine here today, be sure to read all of the information (including the package insert) that comes with your prescription.  This will include important information about the medicine, its side effects, and any warnings that you need to know about.  The pharmacist who fills the prescription can provide more information and answer questions you may have about the medicine.  If you have questions or concerns that the pharmacist cannot  address, please call or return to the Emergency Department.   Remember that you can always come back to the Emergency Department if you are not able to see your regular provider in the amount of time listed above, if you get any new symptoms, or if there is anything that worries you.

## 2024-06-09 NOTE — ED PROVIDER NOTES
"  Emergency Department Note      History of Present Illness     Chief Complaint  Alcohol Intoxication    HPI  Acacia Poe is a 48 year old female, on Xarelto, with a history of PE, CVA, type 2 diabetes, hypertension, hyperlipidemia, and severe alcohol use disorder who presents to the ED via EMS for evaluation of alcohol intoxication. Per EMS report, the patient was at home drinking alcohol with a friend when she stated she \"didn't want to be here.\" Her friend took this as suicidal ideation and called 911. When EMS and police arrived, the patient resisted being brought to the ED so was put in restraints and given 12 mg haldol and 5 mg Versed.  She would like to go home. States she now has bruises to both wrists due to the restraints used with pain. She is able to move both wrists normally. She also has an abrasion to the right fifth pinky. Denies falls or other injuries. Denies other medical concerns.    Independent Historian  EMS as detailed above.    Past Medical History   Medical History and Problem List  Alcohol use disorder, severe  Angioedema of tongue  Obesity  Depression  HTN  BERNICE  PE  CVA  Anemia  HLD  Diabetes, type 2  Alcoholic liver disease  YUNIOR  Chemical dependency  Asthma    Medications  Campral  Amlodipine  Atorvastatin  Jardiance  Bydureon  Fluticasone  Gabapentin  Hydrochlorothiazide  Hydroxyzine  Lisinopril  Seroquel  Xarelto  Crestor  Sertraline  Trazodone  Naltrexone  Prednisone    Surgical History   Appendectomy   section     Physical Exam   Patient Vitals for the past 24 hrs:   BP Temp Temp src Pulse Resp SpO2   24 0117 125/67 98.2  F (36.8  C) Temporal 120 16 94 %     Physical Exam  General: Resting on the gurney, appears comfortable  Head:  The scalp, face, and head appear normal  Mouth/Throat: Mucus membranes are moist  CV:  Regular rate    Normal S1 and S2  No pathological murmur   Resp:  Breath sounds clear and equal bilaterally    Non-labored, no retractions or " accessory muscle use    No coarseness    No wheezing   GI:  Abdomen is soft, no rigidity    No tenderness to palpation  MS:  Normal motor assessment of all extremities.    Good capillary refill noted.    Left wrist tender to palpation.  Skin:  No rash or lesions noted. Contusions to the left wrist.  Abrasion to the right hand  Neuro:   Speech is normal and fluent. No apparent deficit.  Psych: Awake. Alert.   Unwilling to answer questions regarding suicidal thoughts. Labile mood consistent with reported intoxication.        Diagnostics   Independent Interpretation  X-ray wrist shows no fracture  ED Course        Discussion of Management  DEC - will reassess in the morning    Social Determinants of Health adding to complexity of care  Legal Problems/Incarceration    Medical Decision Making / Diagnosis     MDM  Acacia Poe is a 48 year old female who presents for evaluation of alcohol intoxication.  They are intoxicated here in ED by physical examination and history.  There are no signs of trauma related to alcohol use and no further workup is needed including head CT.  Given her suicidal statements, we are still awaiting mental health assessment at this time.  She was attempted to be seen by our mental health  overnight but was unable to be evaluated.  She will be assessed in the morning after having time to sober and sleep.    Disposition  Care of the patient was transferred to my colleague Dr. Martinez pending DEC assessment.     ICD-10 Codes:    ICD-10-CM    1. Alcoholic intoxication with complication (H24)  F10.929       2. Agitation  R45.1              Scribe Disclosure:  KM Toniacleo Stanton, am serving as a scribe at 1:34 AM on 6/9/2024 to document services personally performed by Sammie De Jesus MD based on my observations and the provider's statements to me.        Sammie De Jesus MD  06/09/24 2915

## 2024-06-09 NOTE — ED TRIAGE NOTES
"Patient BIBA from home where she was hanging out with a friend and drinking. Friend called 911 due to patient reportedly making suicidal statements. Patient told PD she was suicidal and to \"shoot her now.\" When they tried to bring her to the hospital, she began to fight with PD and ended up being handcuffed, then placed into restraints by EMS, reportedly requiring many EMS personnel to do so. Patient has lac to base of R pinky and bruising at L wrist. EMS administered 10mg Haldol and 5mg Versed. Patient arrived in restraints and arguing with EMS staff. Did cooperate with staff on arrival and verbalized agreement to refrain from physical or verbal aggression, so restraints were removed at transfer from [facility].  Patient currently denying SI, stating people \"misunderstood\" her statements. States she has been drinking and \"just wants to go home.\"  At this time, cooperative with staff. Will continue to monitor for signs of escalation or aggression.  "

## 2024-06-09 NOTE — ED NOTES
DEC attempted to meet with patient to complete assessment. Patient appeared to be sleeping. Was not able to remain alert enough to engage as evidenced by falling back asleep and snoring. Patient to rest more and will re-attempt assessment in AM.

## 2024-06-09 NOTE — CONSULTS
Diagnostic Evaluation Consultation  Crisis Assessment    Patient Name: Acacia Poe  Age:  48 year old  Legal Sex: female  Gender Identity: female  Pronouns: she/her   Race: Black or   Ethnicity: Not  or   Language: English      Patient was assessed: In person      Patient location: [facility] EMERGENCY DEPT                                 Referral Data and Chief Complaint  Acacia Poe presents to the ED via police. Patient is presenting to the ED for the following concerns: Depression, Suicidal ideation, Substance use.   Factors that make the mental health crisis life threatening or complex are:  Pt presented to the ED due to alcohol intoxication and SI. Pt was talking with a friend last night and Pt was making passive suicidal ideations while intoxicated and this friend called 911. Upon arrival of police Pt became agitated and required IM medications and physical restraint. Pt was transported to the ED for further evaluation.    Informed Consent and Assessment Methods  Explained the crisis assessment process, including applicable information disclosures and limits to confidentiality, assessed understanding of the process, and obtained consent to proceed with the assessment.  Assessment methods included conducting a formal interview with patient, review of medical records, collaboration with medical staff, and obtaining relevant collateral information from family and community providers when available.  : done     Patient response to interventions: eager to participate, acceptance expressed, verbalizes understanding  Coping skills were attempted to reduce the crisis:  Pt has been receptive to ED interventions and Pt appears to be help seeking.     History of the Crisis   Pt presents with a tired and somewhat depressed affect. Pts mood was congruent with this presentation. Pt was oriented x4. Pt was cooperative and engaged during assessment. Pt  "has a psychiatric hx of depression, anxiety, PTSD and alcohol use disorder. Pt has a hx of past [facility] admissions. Pts most recent admission was from 5/8/24 to 5/15/24 at [facility] for a self interrupted suicide attempt via hanging. Pt currently has no outpatient providers but has several outpatient referrals set up for psychiatry, therapy and PHP. Pt also has referrals for PITA tx and is reaching out to [facility].  Pt currently presents to the ED due to  SI and recent alcohol intoxication. During assessment Pt denied any SI. Pt stated that she does have a hx of SI and a hx of 2 past suicide attempts, both self interrupted. Most recently Pt attempted to hang herself with electrical cords in her garage. Pt called her eldest son and he called 911. Pt was admitted and was voluntary during her stay. Pt also endorsed another suicide attempt when she was 14, trying to drown herself in the bathtub and this also was self interrupted. Today Pt did not endorse any SI and her SI is intermittent and chronic. Pt admitted that when she is drinking her SI increases. Pt was able to safety plan with writer. Pt did not endorse any HI/SIB or AH/VH. Pt endorsed ongoing alcohol use but did not endorse any other substances. Pt endorsed several stressors, Pt endorsed that today she was going to spread her mothers ashes with her family. Pt endorsed several issues with grief and past trauma. Pt also stated that she is going through a divorce and also all of her children are adults and is a \"empty myra\" Pt endorsed struggles with finding her own identity now as she is a mother of adult children. Pt endorsed several supports and is future oriented, wanting to seek treatment and also wants to be there for her family and her grandchild.    Brief Psychosocial History  Family:  , Children yes  Support System:  Children, Friend  Employment Status:  unemployed  Source of Income:  none  Financial Environmental Concerns:  " none  Current Hobbies:  television/movies/videos, social media/computer activities, family functions  Barriers in Personal Life:  mental health concerns    Significant Clinical History  Current Anxiety Symptoms:  anxious  Current Depression/Trauma:  sadness, crying or feels like crying, thoughts of death/suicide, negativistic  Current Somatic Symptoms:  anxious  Current Psychosis/Thought Disturbance:   (none reported)  Current Eating Symptoms:   (no issues reported)  Chemical Use History:  Alcohol: Binge  Last Use:: 06/08/24  Benzodiazepines: None  Opiates: None  Marijuana: None  Other Use: None  Withdrawal Symptoms: Tremors, Nausea   Past diagnosis:  Anxiety Disorder, Depression, Substance Use Disorder, PTSD  Family history:  Substance Use Disorder  Past treatment:  Individual therapy, Inpatient Hospitalization, Psychiatric Medication Management, Supportive Living Environment (group home, jail house, etc)  Details of most recent treatment:  Pt currently has no outpatient providers but has several outpatient referrals set up for psychiatry, therapy and PHP. Pt also has referrals for PITA tx and is reaching out to [facility].  Other relevant history:          Collateral Information  Is there collateral information: Yes     Collateral information name, relationship, phone number:  Alfredo (ex-)  [phone] (Mobile)    What happened today: Writer spoke with Pts ex-. Alfredo. He endorsed that Pt was drinking last night and was making suicidal statements and the police brought Pt to the ED.     What is different about patient's functioning: Pt has been drinking more. Pt has been endorsing more passive SI while she is drinking. Pt has been upset with the divorce and that she is still living with Alfredo. Pt has been more depressed. Pt has not endorsed any SIB/HI or AH/VH. Alfredo is not concerned with any other substance use.     Concern about alcohol/drug use:  yes alcohol use     What do you think the  patient needs:  Treatment, inpatient substance use tx     Has patient made comments about wanting to kill themselves/others: yes    If d/c is recommended, can they take part in safety/aftercare planning:  yes    Additional collateral information:  n/a     Risk Assessment  [place] Suicide Severity Rating Scale Full Clinical Version:  Suicidal Ideation  Q1 Wish to be Dead (Lifetime): Yes  Q2 Non-Specific Active Suicidal Thoughts (Lifetime): Yes  3. Active Suicidal Ideation with any Methods (Not Plan) Without Intent to Act (Lifetime): Yes  Q4 Active Suicidal Ideation with Some Intent to Act, Without Specific Plan (Lifetime): Yes  Q5 Active Suicidal Ideation with Specific Plan and Intent (Lifetime): Yes  Q6 Suicide Behavior (Lifetime): yes     Suicidal Behavior (Lifetime)  Actual Attempt (Lifetime): Yes  Total Number of Actual Attempts (Lifetime): 2  Actual Attempt Description (Lifetime): hx of 2 past suicide attempts, both self interrupted. Most recently Pt attempted to hang herself with electrical cords in her garage. Pt called her eldest son and he called 911. Pt was admitted and was voluntary her stay. Pt also endorsed another suicide attempt when she was 14, trying to drown herself in the bathtub and this also was self interrupted.  Has subject engaged in non-suicidal self-injurious behavior? (Lifetime): No  Interrupted Attempts (Lifetime): No  Aborted or Self-Interrupted Attempt (Lifetime): Yes  Total Number of Aborted or Self-Interrupted Attempts (Lifetime): 2  Aborted or Self-Interrupted Attempt Description (Lifetime): hx of 2 past suicide attempts, both self interrupted. Most recently Pt attempted to hang herself with electrical cords in her garage. Pt called her eldest son and he called 911. Pt was admitted and was voluntary her stay. Pt also endorsed another suicide attempt when she was 14, trying to drown herself in the bathtub and this also was self interrupted.  Preparatory Acts or Behavior (Lifetime):  No    Environmental or Psychosocial Events: other life stressors, impulsivity/recklessness, ongoing abuse of substances, challenging interpersonal relationships, loss of a relationship due to divorce/separation, neither working nor attending school  Protective Factors: Protective Factors: strong bond to family unit, community support, or employment, lives in a responsibly safe and stable environment, sense of importance of health and wellness, help seeking, optimistic outlook - identification of future goals    Does the patient have thoughts of harming others? Feels Like Hurting Others: no  Previous Attempt to Hurt Others: no  Is the patient engaging in sexually inappropriate behavior?: no    Is the patient engaging in sexually inappropriate behavior?  no        Mental Status Exam   Affect: Appropriate  Appearance: Appropriate  Attention Span/Concentration: Attentive  Eye Contact: Engaged    Fund of Knowledge: Appropriate   Language /Speech Content: Fluent  Language /Speech Volume: Normal  Language /Speech Rate/Productions: Normal  Recent Memory: Intact  Remote Memory: Intact  Mood: Anxious, Depressed  Orientation to Person: Yes   Orientation to Place: Yes  Orientation to Time of Day: Yes  Orientation to Date: Yes     Situation (Do they understand why they are here?): Yes  Psychomotor Behavior: Normal  Thought Content: Clear  Thought Form: Intact     Medication  Psychotropic medications:   Medication Orders - Psychiatric (From admission, onward)      None             Current Care Team  Patient Care Team:  Candace Duff NP as PCP - General    Diagnosis  Patient Active Problem List   Diagnosis Code    Exercise-induced asthma J45.990    Tobacco use Z72.0    History of anemia Z86.2    YUNIOR (obstructive sleep apnea) AHI 28 CPAP 11-15 G47.33    Cervical high risk HPV (human papillomavirus) test positive R87.810    Pulmonary embolism (H) I26.99    Alcohol abuse F10.10    Chemical dependency (H) F19.20    PTSD  (post-traumatic stress disorder) F43.10    Severe episode of recurrent major depressive disorder, without psychotic features (H) F33.2    BERNICE (generalized anxiety disorder) F41.1       Primary Problem This Admission  Active Hospital Problems    PTSD (post-traumatic stress disorder)      Severe episode of recurrent major depressive disorder, without psychotic features (H)      BERNICE (generalized anxiety disorder)      Alcohol abuse        Clinical Summary and Substantiation of Recommendations   Pt is a 47 y/o female with a psychiatric hx of MDD, BERNICE, PTSD and alcohol use disorder. At this time IP MH admission is not being recommended due to Pt not endorsing any SI/SIB/HI or AH/VH. Pt was able to fully safety plan with writer. Pts current presentation appears to be chronic in nature and Pts current sx appear to be at Pts baseline. During current assessment Pt does not present with any modifiable risk factors that are likely to be mitigated in a hospital setting. Further, current sx and presentation are unlikely to be changed or alleviated by medication management or IP MH therapeutic interventions during a brief hospital stay. Pt does appear to be at higher risk of death by suicide accidental or intentional due to mental health hx and substance use hx.  At this time IP MH admission does not appear to be the most therapeutically beneficial intervention/ level of care for Pt. Pt appears to be able to use and motivated to engage in supportive mental health/ community resources. Pt is future oriented and has several outpatient referrals already established.    Patient coping skills attempted to reduce the crisis:  Pt has been receptive to ED interventions and Pt appears to be help seeking.    Disposition  Recommended disposition: Individual Therapy, Medication Management, Programmatic Care, Substance Abuse Disorder Treatment        Reviewed case and recommendations with attending provider. Attending Name: MD De Jesus        Attending concurs with disposition: yes       Patient and/or validated legal guardian concurs with disposition:   yes       Final disposition:  discharge    Legal status on admission: Voluntary/Patient has signed consent for treatment    Assessment Details   Total duration spent with the patient: 30 min     CPT code(s) utilized: 05251 - Psychotherapy for Crisis - 60 (30-74*) min    Elizabeth Cardoso [facility], Psychotherapist  [facility] - Triage & Transition Services  Callback: [phone]

## 2025-06-14 ENCOUNTER — HEALTH MAINTENANCE LETTER (OUTPATIENT)
Age: 50
End: 2025-06-14